# Patient Record
Sex: FEMALE | Race: WHITE | Employment: STUDENT | URBAN - METROPOLITAN AREA
[De-identification: names, ages, dates, MRNs, and addresses within clinical notes are randomized per-mention and may not be internally consistent; named-entity substitution may affect disease eponyms.]

---

## 2024-05-13 ENCOUNTER — OFFICE VISIT (OUTPATIENT)
Facility: LOCATION | Age: 19
End: 2024-05-13
Payer: COMMERCIAL

## 2024-05-13 DIAGNOSIS — J34.3 NASAL TURBINATE HYPERTROPHY: ICD-10-CM

## 2024-05-13 DIAGNOSIS — J34.2 DEVIATED SEPTUM: Primary | ICD-10-CM

## 2024-05-13 NOTE — PROGRESS NOTES
[facility], [facility]    Report of Consultation    Date of Consult: 5/13/2024     Reason for Consultation:   Chronic nasal congestion.    History of Present Illness:   Patient is a 19 year old female who is being seen for chronic nasal congestion.  Patient always feels as if she cannot breathe through her nose.  She has tried over-the-counter Flonase as well as allergy medications with no significant change.  She was tested for allergies and really had no significant findings.  She denies history nasal surgery nasal trauma.  She has had very little purulent rhinorrhea.  She recently returned from studying abroad in [place].    Past Medical History  Past Medical History:    Anxiety       Past Surgical History  History reviewed. No pertinent surgical history.    Family History  History reviewed. No pertinent family history.    Social History  Pediatric History   Patient Parents    Rosalee Gabriel (Mother)    Lionel Zamudio (Father)     Other Topics Concern    Not on file   Social History Narrative    Not on file           Current Medications:  Current Outpatient Medications   Medication Sig Dispense Refill    sertraline 50 MG Oral Tab Take 1 tablet (50 mg total) by mouth daily. 90 tablet 0    Aluminum Chloride (DRYSOL) 20 % External Solution Apply to hands QHS as tolerated. 60 mL 0    Norethin Ace-Eth Estrad-FE (JUNEL FE 1/20) 1-20 MG-MCG Oral Tab Take 1 tablet by mouth daily. 84 tablet 0       Allergies  Allergies   Allergen Reactions    Amoxil HIVES       Review of Systems:   A comprehensive review of systems was negative.    Physical Exam:   not currently breastfeeding.         Constitutional Normal Overall appearance - Normal.   Psychiatric Normal Orientation - Oriented to time, place, person & situation. Appropriate mood and affect.   Head/Face Normal Facial features -- Normal. Skull - Normal.   Eyes Normal Pupils equal ,round ,react to light and accomidate   Ears  Normal External Ear Right: Normal, Left: Normal. Canal - Right: Normal, Left: Normal. TM - Right: Normal, Left: Normal.   Nose Normal External Nose, Normal, Septum -deviated to left with swelling of the inferior turbinates.   Mouth/Throat Normal Lips/teeth/gums - Normal. Tonsils - Normal. Oropharynx - Normal.   Neck Exam Normal Inspection - Normal. Palpation - Normal. Parotid gland - Normal. Thyroid gland - Normal.   Neurological Normal Memory - Normal. Cranial nerves - Cranial nerves II through XII grossly intact.   Nasopharynx Normal  Normal        Skin Normal Inspection - Normal.        Lymph Detail Normal Submental. Submandibular. Anterior cervical. Posterior cervical. Supraclavicular.       Results:     Laboratory Data:  No results found for: \"WBC\", \"HGB\", \"HCT\", \"PLT\", \"CREATSERUM\", \"BUN\", \"NA\", \"K\", \"CL\", \"CO2\", \"GLU\", \"CA\", \"ALB\", \"ALKPHO\", \"TP\", \"AST\", \"ALT\", \"PTT\", \"INR\", \"PTP\", \"T4F\", \"TSH\", \"TSHREFLEX\", \"NUSRAT\", \"LIP\", \"GGT\", \"PSA\", \"DDIMER\", \"ESRML\", \"ESRPF\", \"CRP\", \"BNP\", \"MG\", \"PHOS\", \"TROP\", \"CK\", \"CKMB\", \"SAW\", \"RPR\", \"B12\", \"ETOH\", \"POCGLU\"      Imaging:  No results found.      Impression:   Deviated nasal septum with bilateral inferior turbinate hypertrophy.    Recommendations:  I recommended a CT scan but due to cost we will wait to see how she responds to medications.  She recently purchased a Navage.  I encouraged her to use that every day.  She also will try Astelin seeing if that improves some of the congestion.  She will let us know how she is feeling in 3 to 4 weeks and may require CT at that point.    The patient understands her treatment plan.      Thank you for allowing me to participate in the care of your patient.      Fausto Norris MD  5/13/2024

## 2024-05-15 ENCOUNTER — TELEPHONE (OUTPATIENT)
Facility: LOCATION | Age: 19
End: 2024-05-15

## 2024-05-15 NOTE — TELEPHONE ENCOUNTER
Patient's father requesting a preauthorization request to be sent to patient's insurance to get CT approved.    Patient also requesting prescription for Astelin to [facility] pharmacy on file.

## 2024-05-16 RX ORDER — AZELASTINE 1 MG/ML
2 SPRAY, METERED NASAL 2 TIMES DAILY
Qty: 30 ML | Refills: 3 | Status: SHIPPED | OUTPATIENT
Start: 2024-05-16

## 2024-06-05 ENCOUNTER — TELEPHONE (OUTPATIENT)
Facility: LOCATION | Age: 19
End: 2024-06-05

## 2024-06-05 NOTE — TELEPHONE ENCOUNTER
Called Mobile number on file and left a voicemail. Pt needs to schedule an appointment. Staff certifies the test the day before appointments. Pt was able to get approval from last office visit, but Pt did not want to do the scan at that time. It should be no problem getting another approval, but we must schedule Pt for an appointment first.